# Patient Record
Sex: FEMALE | Race: WHITE | NOT HISPANIC OR LATINO | URBAN - METROPOLITAN AREA
[De-identification: names, ages, dates, MRNs, and addresses within clinical notes are randomized per-mention and may not be internally consistent; named-entity substitution may affect disease eponyms.]

---

## 2019-01-04 ENCOUNTER — OFFICE VISIT CONVERTED (OUTPATIENT)
Dept: OTHER | Facility: HOSPITAL | Age: 29
End: 2019-01-04
Attending: NURSE PRACTITIONER

## 2019-01-04 ENCOUNTER — CONVERSION ENCOUNTER (OUTPATIENT)
Dept: OTHER | Facility: HOSPITAL | Age: 29
End: 2019-01-04

## 2019-02-04 ENCOUNTER — OFFICE VISIT CONVERTED (OUTPATIENT)
Dept: OTHER | Facility: HOSPITAL | Age: 29
End: 2019-02-04
Attending: NURSE PRACTITIONER

## 2019-02-04 ENCOUNTER — CONVERSION ENCOUNTER (OUTPATIENT)
Dept: OTHER | Facility: HOSPITAL | Age: 29
End: 2019-02-04

## 2021-05-15 VITALS
OXYGEN SATURATION: 97 % | HEART RATE: 89 BPM | TEMPERATURE: 97.7 F | DIASTOLIC BLOOD PRESSURE: 60 MMHG | RESPIRATION RATE: 18 BRPM | HEIGHT: 60 IN | WEIGHT: 181 LBS | SYSTOLIC BLOOD PRESSURE: 118 MMHG | BODY MASS INDEX: 35.53 KG/M2

## 2021-05-15 VITALS
BODY MASS INDEX: 33.99 KG/M2 | HEIGHT: 61 IN | SYSTOLIC BLOOD PRESSURE: 118 MMHG | DIASTOLIC BLOOD PRESSURE: 60 MMHG | RESPIRATION RATE: 18 BRPM | OXYGEN SATURATION: 100 % | HEART RATE: 73 BPM | WEIGHT: 180 LBS | TEMPERATURE: 98 F

## 2025-01-14 ENCOUNTER — HOSPITAL ENCOUNTER (OUTPATIENT)
Dept: OTHER | Facility: HOSPITAL | Age: 35
Discharge: HOME | End: 2025-01-14

## 2025-05-25 ENCOUNTER — HOSPITAL ENCOUNTER (OUTPATIENT)
Dept: OTHER | Facility: HOSPITAL | Age: 35
Discharge: HOME | End: 2025-05-25

## 2025-06-26 NOTE — PROGRESS NOTES
Chief Complaint  Papilledema, IIH    Patient or patient representative verbalized consent for the use of Ambient Listening during the visit with  Satnam Boone MD PhD for chart documentation. 7/1/2025  13:46 EDT    Subjective      History of Present Illness:  Stephanie Benjamin is a delightful 34 y.o. right handed female who presents to [facility] NEUROLOGY [facility]GERY referred for papilledema with IIH with history of: Anxiety and depression, Brain tumor (benign), Chronic intractable headache, Genital herpes, seizures (last 2020).  Past Medical History:   Diagnosis Date    Brain tumor (benign)     Cataracts, bilateral     Migraine      Mom lorena accompanied today.    From [facility] 5/25/2025:  Patient presents to the ED today complaint of headache and head pressure. Patient states she does have a significant history of intracranial hypertension. Patient's symptoms started 5 days ago. Patient denies any injury or trauma. Patient that she got tingling in her left upper extremity. Patient has tried Motrin at home without relief. Patient also complains of injury to her left great toe as it was smashed and the nail was torn off. Patient denies any chest pain, shortness of breath, abdominal pain, nausea, vomiting or fever.     History of Present Illness  The patient is a 34-year-old female referred to neurology for suspicion of idiopathic intracranial hypertension. She is accompanied by her mother.    She is right-handed and has been experiencing headaches, which she believes are due to a pseudotumor. Approximately one year ago, she underwent an MRI and spinal fluid test under the care of Dr. LEE Galvez, which revealed elevated opening pressure. Despite trying various medications, including Topamax, her condition remains unchanged. She has been following up with ophthalmology since then, with her last visit in 05/2025. During this visit, her eyes were dilated, fundus photographs were  taken, visual field testing was performed, and OCT testing was conducted. She reports pressure behind her right eye and some nerve damage in the same eye.    For the past few months, she has been experiencing headaches almost daily, with each episode lasting several days. The pain intensity varies between 6 and 10 on a scale of 10. These headaches are often accompanied by dizziness, nausea, and sensitivity to light and sound. She was previously diagnosed with migraine before the pseudotumor diagnosis. She has tried various medications for her headaches, including Excedrin Migraine, Tylenol, ibuprofen, hydros, topiramate, Emgality, Ajovy, and nasal sprays. However, none of these have provided relief. She discontinued topiramate due to its ineffectiveness. She has been taking acetazolamide 250 mg twice a day for the past 2 weeks and Excedrin Migraine for the past 6 months.    She reports no history of stroke, mini stroke, heart attack, coronary artery disease, peripheral artery disease, ulcerative colitis, Crohn's disease, or uncontrolled blood pressure. She averages about 5 hours of sleep per night, which has been the case for the past 2 years. This lack of sleep results in daytime fatigue and the need for napping. She also reports snoring but does not stop breathing during these episodes. She reports no family history of headache or migraine. Her headaches improve immediately after a large volume of fluid is drawn off during the LP.    SOCIAL HISTORY:    Sleep: Averages about 5 hours of sleep per night for the past 2 years    FAMILY HISTORY  - Negative for headache and migraine    MEDICATIONS  CURRENT MEDS:  Acetazolamide 250 mg Oral Twice daily  Start Date: 06/2025  Excedrin Migraine Oral Twice weekly  Start Date: 01/2025  Metformin  PREVIOUS MEDS:  Emgality Injection  Reason for Discontinuation: Not effective  Topiramate Oral  Reason for Discontinuation: Not effective  Ajovy Injection  Reason for Discontinuation:  "Not effective  Excedrin Migraine Oral  Reason for Discontinuation: Medication overuse  Tylenol Oral  Ibuprofen Oral  Hydrocodone Oral            All available pertinent Labs and Imaging personally reviewed, including:    Imaging:    MRI orbits wow contrast 1/14/2025:  IMPRESSION:   Subtle flattening of sclera of globes, which suggests IIH. Clinically correlate.        Labs:  Lab Results   Component Value Date    WBC 6.62 06/26/2023    HGB 11.5 (L) 06/26/2023    HCT 35.8 06/26/2023    MCV 81.0 06/26/2023     06/26/2023     Lab Results   Component Value Date    BUN 10 01/13/2024    CREATININE 0.6 (L) 01/13/2024     01/13/2024    K 3.9 01/13/2024     01/13/2024    CALCIUM 9.1 01/13/2024    ALBUMIN 3.9 01/13/2024    ALT 18 01/13/2024    AST 15 01/13/2024    ALKPHOS 104 01/13/2024    BILITOT 0.33 01/13/2024    ANIONGAP 9 01/13/2024     No results found for: \"HGBA1C\"  No results found for: \"TSH\"  Lab Results   Component Value Date    WMUFGVCN86 145 01/14/2025     No results found for: \"FOLATE\"  No results found for: \"CHOL\", \"CHLPL\", \"TRIG\", \"HDL\", \"LDL\", \"LDLDIRECT\"      Objective   Vital Signs:   /71   Pulse 76   Ht 152.4 cm (60\")   Wt 103 kg (227 lb 8 oz)   SpO2 100%   BMI 44.43 kg/m²     GENERAL:  GEN: owgt, well appearing, no apparent distress, appropriately dressed and groomed.  HEENT: NCAT, nml symm facies, no LNA, no goiter  LUNGS: CTA alicia, no wheezes/crackles/rhonchi noted  Card: RRR, no m noted, no carotid bruit, alicia rad and dp pulses 2+ with cap refill < 2 sec  EXT: no cce, no rash noted    NEUROLOGICAL:  MS: A+O x 3, language spontaneous, fluent with logical content, no word finding, no repeating or perseveration, reported own and regarded as excellent historian, normal judgement and insight, anxious;   Mom present and adds to and corroborates history.  CN: PERRLA, full excursions in all cardinal directions with smooth pursuits throughout without saccadic breaks or nystagmus noted; " horizontal and vertical saccades symmetric and on target. Cover test reveals no phoria. Visual fields full to confrontation. V1-III Light touch symm and intact; symm jaw clench masseter and temporalis bulk and tone, medial and lateral pterygoids intact. Symm forehead crease and grimace. Hearing grossly symm and intact to finger rub. Uvula and soft palate midline rise on gag. Sternocleidomastoids and traps symm and 5/5. Tongue demonstrates no furrowing and extends midline and into left and right.  MOTOR: no atrophy/drift, normal tone, strength 5/5, no adventitial movements or tremor noted  SENSORY: LT/PP/Vib intact, symm, and wnL for age. Romberg - NEG  COORD: JUAN/FTN/HTS with good rhythm, speed, and amplitude. No ataxia noted.  GAIT: Native mild wide based valgus knees gait with good stride, nml symm alicia armswing, nml start/stop/turn. Toe and heel walk without difficulty. Tandem walk without difficulty.  DTR's: 2+ throughout with 3+ patellas; no clonus, Babinski - Absent     Trigger points in neck and upper back which upon palpation reproduce/exacerbate headache pattern (100%).      ASSESSMENT & PLAN:    34 y.o. female who presents to [facility] NEUROLOGY [facility] NE[facility]OSURGERY referred for papilledema with IIH.    From [facility] 5/25/2025:  Patient presents to the ED today complaint of headache and head pressure. Patient states she does have a significant history of intracranial hypertension. Patient's symptoms started 5 days ago. Patient denies any injury or trauma. Patient that she got tingling in her left upper extremity. Patient has tried Motrin at home without relief. Patient also complains of injury to her left great toe as it was smashed and the nail was torn off. Patient denies any chest pain, shortness of breath, abdominal pain, nausea, vomiting or fever.          Diagnoses and all orders for this visit:    1. Pseudotumor (Primary)    2. Medication overuse headache    3. Bilateral occipital  neuralgia         Assessment & Plan  1. Mixed headache disorder.  She presents with a mixed headache disorder, including components of migraines, medication overuse, bilateral occipital neuralgia, and pseudotumor. The primary concern is the pseudotumor, which can lead to vision loss. Her current medication regimen includes acetazolamide 250 mg twice daily and Excedrin Migraine. The potential side effects of acetazolamide were discussed, including dizziness, gastrointestinal upset, tingling in the hands and around the mouth, changes in taste, brain fog, and possible weight loss of 10 to 15 pounds over 4 to 6 months. It was explained that weight loss could be beneficial for her pseudotumor. The risks associated with medication overuse were also discussed. She was advised to continue her regular ophthalmology visits every 3 months. Her acetazolamide dosage will be increased to 500 mg twice daily by titration with 500mg every morning for 1 week then twice daily.  She was advised to avoid Tylenol, caffeine, and aspirin for a minimum of 3 months. If she tolerates the increased acetazolamide dosage but continues to experience headaches, the dosage may be further increased or zonisamide may be added to her treatment plan.  At FU, will consider increasing diamox/acetazolamide to 500mg tid.  Keep a headache diary.  Patient will discuss obtaining prior records (LP, Neuro, Ophtho) with  on discharge.    Follow-up  The patient will follow up in 1 month.    Common side effects of acetazolamide explained to patient may include: change in taste, weight loss, marly-oral pins and needles, mental confusion. Advised to remain hydrated to help avoid increased risk of kidney stones.    EXTERNAL RECORD REVIEW:  Neurology:  2/14/2025 ([facility]): seen for migraine, MRI brain with soft signs of pseudotumor 1/14/2025 noted with plan to obtain LP.  Lumbar Puncture:  6/26/2023: IR guided LP, OP 25 cm H2O, 10 cc removed, CP 19 cm.  Ophtho  notes:  5/16/2025: No papilledema noted.  11/25/2024: No papilledema  4/10/2024: No papilledema      Follow Up  Return in about 4 weeks (around 7/29/2025) for IIH, acetazolamide titration.    47 minutes were spent caring for Stephanie Benjamin on this date of service. This time spent by me includes preparing for the visit, reviewing tests, obtaining/reviewing separately obtained history, performing medically appropriate exam/evaluation, counseling/educating the patient/family/caregiver, ordering medications/tests/procedures, referring/communicating with other health care professionals, documenting information in the medical record, independently interpreting results and communicating that with the patient/family/caregiver and/or care coordination.     Patient was given instructions and counseling regarding her condition or for health maintenance advice. Please see specific information pulled into the AVS if appropriate.

## 2025-06-27 ENCOUNTER — HOSPITAL ENCOUNTER (EMERGENCY)
Facility: HOSPITAL | Age: 35
Discharge: HOME | End: 2025-06-27
Attending: EMERGENCY MEDICINE
Payer: MEDICAID

## 2025-06-27 ENCOUNTER — APPOINTMENT (OUTPATIENT)
Dept: CT IMAGING | Facility: HOSPITAL | Age: 35
End: 2025-06-27
Payer: MEDICAID

## 2025-06-27 VITALS
HEART RATE: 98 BPM | BODY MASS INDEX: 45.66 KG/M2 | OXYGEN SATURATION: 96 % | SYSTOLIC BLOOD PRESSURE: 107 MMHG | HEIGHT: 60 IN | TEMPERATURE: 98.1 F | RESPIRATION RATE: 18 BRPM | DIASTOLIC BLOOD PRESSURE: 76 MMHG | WEIGHT: 232.59 LBS

## 2025-06-27 DIAGNOSIS — G43.809 OTHER MIGRAINE WITHOUT STATUS MIGRAINOSUS, NOT INTRACTABLE: Primary | ICD-10-CM

## 2025-06-27 PROCEDURE — 25810000003 LACTATED RINGERS SOLUTION

## 2025-06-27 PROCEDURE — 25010000002 KETOROLAC TROMETHAMINE PER 15 MG

## 2025-06-27 PROCEDURE — 25010000002 METOCLOPRAMIDE PER 10 MG

## 2025-06-27 PROCEDURE — 70450 CT HEAD/BRAIN W/O DYE: CPT

## 2025-06-27 PROCEDURE — 96374 THER/PROPH/DIAG INJ IV PUSH: CPT

## 2025-06-27 PROCEDURE — 25010000002 DIPHENHYDRAMINE PER 50 MG

## 2025-06-27 PROCEDURE — 99284 EMERGENCY DEPT VISIT MOD MDM: CPT

## 2025-06-27 PROCEDURE — 96375 TX/PRO/DX INJ NEW DRUG ADDON: CPT

## 2025-06-27 RX ORDER — METOCLOPRAMIDE HYDROCHLORIDE 5 MG/ML
10 INJECTION INTRAMUSCULAR; INTRAVENOUS ONCE
Status: COMPLETED | OUTPATIENT
Start: 2025-06-27 | End: 2025-06-27

## 2025-06-27 RX ORDER — VENLAFAXINE HYDROCHLORIDE 150 MG/1
150 CAPSULE, EXTENDED RELEASE ORAL DAILY
COMMUNITY
Start: 2024-09-24 | End: 2025-09-25

## 2025-06-27 RX ORDER — ACETAMINOPHEN, ASPIRIN, AND CAFFEINE 250; 250; 65 MG/1; MG/1; MG/1
TABLET, FILM COATED ORAL AS NEEDED
COMMUNITY

## 2025-06-27 RX ORDER — DIPHENHYDRAMINE HYDROCHLORIDE 50 MG/ML
25 INJECTION, SOLUTION INTRAMUSCULAR; INTRAVENOUS ONCE
Status: COMPLETED | OUTPATIENT
Start: 2025-06-27 | End: 2025-06-27

## 2025-06-27 RX ORDER — ACETAZOLAMIDE 250 MG/1
250 TABLET ORAL
COMMUNITY
Start: 2025-05-30

## 2025-06-27 RX ORDER — KETOROLAC TROMETHAMINE 30 MG/ML
30 INJECTION, SOLUTION INTRAMUSCULAR; INTRAVENOUS ONCE
Status: COMPLETED | OUTPATIENT
Start: 2025-06-27 | End: 2025-06-27

## 2025-06-27 RX ADMIN — SODIUM CHLORIDE, POTASSIUM CHLORIDE, SODIUM LACTATE AND CALCIUM CHLORIDE 500 ML: 600; 310; 30; 20 INJECTION, SOLUTION INTRAVENOUS at 20:26

## 2025-06-27 RX ADMIN — KETOROLAC TROMETHAMINE 30 MG: 30 INJECTION, SOLUTION INTRAMUSCULAR; INTRAVENOUS at 20:27

## 2025-06-27 RX ADMIN — METOCLOPRAMIDE 10 MG: 5 INJECTION, SOLUTION INTRAMUSCULAR; INTRAVENOUS at 20:27

## 2025-06-27 RX ADMIN — DIPHENHYDRAMINE HYDROCHLORIDE 25 MG: 50 INJECTION, SOLUTION INTRAMUSCULAR; INTRAVENOUS at 20:26

## 2025-06-27 NOTE — ED PROVIDER NOTES
"SHARED VISIT ATTESTATION:    This visit was performed by myself and an APC.  I personally approved the management plan/medical decision making and take responsibility for the patient management.      SHARED VISIT NOTE:    Patient is 34 y.o. year old female that presents to the ED for evaluation of migraine headache.     Physical Exam    ED Course:    /76 (BP Location: Left arm, Patient Position: Lying)   Pulse 98   Temp 98.1 °F (36.7 °C) (Oral)   Resp 18   Ht 152.4 cm (60\")   Wt 105 kg (232 lb 9.4 oz)   SpO2 96%   BMI 45.42 kg/m²       The following orders were placed and all results were independently analyzed by me:  Orders Placed This Encounter   Procedures    CT Head Without Contrast       Medications Given in the Emergency Department:  Medications   lactated ringers bolus 500 mL (0 mL Intravenous Stopped 6/27/25 2134)   ketorolac (TORADOL) injection 30 mg (30 mg Intravenous Given 6/27/25 2027)   metoclopramide (REGLAN) injection 10 mg (10 mg Intravenous Given 6/27/25 2027)   diphenhydrAMINE (BENADRYL) injection 25 mg (25 mg Intravenous Given 6/27/25 2026)        ED Course:    ED Course as of 06/27/25 2141 Fri Jun 27, 2025 2128 Patient states her migraine has improved [AJ]      ED Course User Index  [AJ] Brandon Joiner PA-C       Labs:    Lab Results (last 24 hours)       ** No results found for the last 24 hours. **             Imaging:    CT Head Without Contrast  Result Date: 6/27/2025  CT HEAD WO CONTRAST Date of Exam: 6/27/2025 8:04 PM EDT Indication: HA/VC. Comparison: 1/24/2023 Technique: Axial CT images were obtained of the head without contrast administration.  Reconstructed coronal and sagittal images were also obtained. Automated exposure control and iterative construction methods were used. Findings: Gray-white matter differentiation is maintained without evidence of an acute infarction. No intracranial mass or mass effect. No extra-axial mass or collection. The ventricles and " sulci are normal in size and configuration. The posterior fossa appears normal. Sellar and suprasellar structures are normal. Orbital and paranasal soft tissues are normal. Mucoid retention cyst or polyp within the right maxillary sinus. The bony calvarium appears intact. No acute fractures. No lytic or blastic bony diseases.     Impression: No acute intracranial pathology. Electronically Signed: Papa Bridges MD  6/27/2025 8:32 PM EDT  Workstation ID: [id number]      MDM:    Procedures    CT scan was performed in the emergency department and the CT scan radiology impression was interpreted by me.                     Tobin Monk MD  21:41 EDT  06/27/25         Tobin Monk MD  06/27/25 [id number]

## 2025-06-27 NOTE — ED PROVIDER NOTES
Time: 7:56 PM EDT  Date of encounter:  6/27/2025  Independent Historian/Clinical History and Information was obtained by:   Patient    History is limited by: N/A    Chief Complaint   Patient presents with    Headache         History of Present Illness:  Patient is a 34 y.o. year old female who presents to the emergency department for evaluation of migraine that started this morning upon awakening.  Patient has a history of migraines and states she also has a history of intracranial hypertension.  States that she started feeling off yesterday and started having some blurry vision in her left eye.  She states she thought it was normal because she has had cataract surgery in her left eye before.  She states it is seem more blurry than normal.  She denies fall or injury.  She took some migraine medication that she cannot remember the name of.  Admits to nausea, vomiting and photosensitivity.    Patient Care Team  Primary Care Provider: Anthony Falk MD    Past Medical History:     No Known Allergies  Past Medical History:   Diagnosis Date    Brain tumor (benign)     Cataracts, bilateral     Migraine      Past Surgical History:   Procedure Laterality Date    EYE SURGERY Bilateral     HYSTERECTOMY       History reviewed. No pertinent family history.    Home Medications:  Prior to Admission medications    Medication Sig Start Date End Date Taking? Authorizing Provider   acetaZOLAMIDE (DIAMOX) 250 MG tablet Take 1 tablet by mouth. 5/30/25  Yes Aidee Ruano MD   metFORMIN (GLUCOPHAGE) 500 MG tablet Take 1 tablet by mouth. 5/30/25 5/31/26 Yes Aidee Ruano MD   venlafaxine XR (EFFEXOR-XR) 150 MG 24 hr capsule Take 1 capsule by mouth Daily. 9/24/24 9/25/25 Yes Aidee Ruano MD   aspirin-acetaminophen-caffeine (Excedrin Migraine) 250-250-65 MG per tablet As Needed.    ProviderAidee MD        Social History:   Social History     Tobacco Use    Smoking status: Never    Smokeless tobacco: Never  "  Vaping Use    Vaping status: Never Used   Substance Use Topics    Alcohol use: Never    Drug use: Never         Review of Systems:  Review of Systems   Eyes:  Positive for photophobia and visual disturbance.   Gastrointestinal:  Positive for nausea and vomiting.   Neurological:  Positive for headaches. Negative for speech difficulty and weakness.        Physical Exam:  /90   Pulse 79   Temp 98.8 °F (37.1 °C) (Oral)   Resp 22   Ht 152.4 cm (60\")   Wt 105 kg (232 lb 9.4 oz)   SpO2 96%   BMI 45.42 kg/m²         Physical Exam  Vitals and nursing note reviewed.   Constitutional:       Appearance: Normal appearance.   HENT:      Head: Normocephalic and atraumatic.      Nose: Nose normal.      Mouth/Throat:      Mouth: Mucous membranes are moist.   Eyes:      General:         Right eye: No discharge.         Left eye: No discharge.      Extraocular Movements: Extraocular movements intact.      Conjunctiva/sclera: Conjunctivae normal.      Pupils: Pupils are equal, round, and reactive to light.   Cardiovascular:      Rate and Rhythm: Normal rate and regular rhythm.      Heart sounds: Normal heart sounds.   Pulmonary:      Effort: Pulmonary effort is normal.      Breath sounds: Normal breath sounds.   Abdominal:      General: Abdomen is flat.      Palpations: Abdomen is soft.   Musculoskeletal:         General: Normal range of motion.      Cervical back: Normal range of motion and neck supple.   Skin:     General: Skin is warm and dry.   Neurological:      General: No focal deficit present.      Mental Status: She is alert and oriented to person, place, and time.      Cranial Nerves: No cranial nerve deficit.      Motor: No weakness.      Coordination: Coordination normal.   Psychiatric:         Mood and Affect: Mood normal.         Behavior: Behavior normal.                          Medical Decision Making:      Comorbidities that affect care:    Brain tumor, migraine, cataract    External Notes " reviewed:    Previous Clinic Note: PCP visit 5/30/2025 for Papilledema, idiopathic intracranial hypertension referred to neurology., Previous ED Note: ED visit at [facility] 5/25/2025 for headache, and Previous Radiological Studies: CT head 5/25/2025 that showed sinusitis      The following orders were placed and all results were independently analyzed by me:  Orders Placed This Encounter   Procedures    CT Head Without Contrast       Medications Given in the Emergency Department:  Medications   lactated ringers bolus 500 mL (500 mL Intravenous New Bag 6/27/25 2026)   ketorolac (TORADOL) injection 30 mg (30 mg Intravenous Given 6/27/25 2027)   metoclopramide (REGLAN) injection 10 mg (10 mg Intravenous Given 6/27/25 2027)   diphenhydrAMINE (BENADRYL) injection 25 mg (25 mg Intravenous Given 6/27/25 2026)        ED Course:    The patient was initially evaluated in the triage area where orders were placed. The patient was later dispositioned by Brandon Joiner PA-C.      The patient was advised to stay for completion of workup which includes but is not limited to communication of labs and radiological results, reassessment and plan. The patient was advised that leaving prior to disposition by a provider could result in critical findings that are not communicated to the patient.     ED Course as of 06/27/25 2129 Fri Jun 27, 2025 2128 Patient states her migraine has improved [AJ]      ED Course User Index  [AJ] Brandon Joiner PA-C       Labs:    Lab Results (last 24 hours)       ** No results found for the last 24 hours. **             Imaging:    CT Head Without Contrast  Result Date: 6/27/2025  CT HEAD WO CONTRAST Date of Exam: 6/27/2025 8:04 PM EDT Indication: HA/VC. Comparison: 1/24/2023 Technique: Axial CT images were obtained of the head without contrast administration.  Reconstructed coronal and sagittal images were also obtained. Automated exposure control and iterative construction methods  were used. Findings: Gray-white matter differentiation is maintained without evidence of an acute infarction. No intracranial mass or mass effect. No extra-axial mass or collection. The ventricles and sulci are normal in size and configuration. The posterior fossa appears normal. Sellar and suprasellar structures are normal. Orbital and paranasal soft tissues are normal. Mucoid retention cyst or polyp within the right maxillary sinus. The bony calvarium appears intact. No acute fractures. No lytic or blastic bony diseases.     Impression: No acute intracranial pathology. Electronically Signed: Papa Bridges MD  6/27/2025 8:32 PM EDT  Workstation ID: [id number]        Differential Diagnosis and Discussion:      Headache: Differential diagnosis includes but is not limited to migraine, cluster headache, hypertension, tumor, subarachnoid bleeding, pseudotumor cerebri, temporal arteritis, infections, tension headache, and TMJ syndrome.    PROCEDURES:    CT scan was performed in the emergency department and the CT scan radiology impression was interpreted by me.    No orders to display        Procedures    MDM     Amount and/or Complexity of Data Reviewed  Tests in the radiology section of CPT®: reviewed                     Patient Care Considerations:          Consultants/Shared Management Plan:    SHARED VISIT: I have discussed the case with my supervising physician, Dr. Monk who states reviewed patient's imaging. The substantive portion of the medical decision was made by the attesting physician who made or approve the management plan and will take responsibility for the patient.  Clinical findings were discussed and ultimate disposition was made in consult with supervising physician.    Social Determinants of Health:    Patient has presented with family members who are responsible, reliable and will ensure follow up care.      Disposition and Care Coordination:    Discharged: The patient is suitable and stable for  discharge with no need for consideration of admission.    I have explained the patient´s condition, diagnoses and treatment plan based on the information available to me at this time. I have answered questions and addressed any concerns. The patient has a good  understanding of the patient´s diagnosis, condition, and treatment plan as can be expected at this point. The vital signs have been stable. The patient´s condition is stable and appropriate for discharge from the emergency department.      The patient will pursue further outpatient evaluation with the primary care physician or other designated or consulting physician as outlined in the discharge instructions. They are agreeable to this plan of care and follow-up instructions have been explained in detail. The patient has received these instructions in written format and has expressed an understanding of the discharge instructions. The patient is aware that any significant change in condition or worsening of symptoms should prompt an immediate return to this or the closest emergency department or call to 911.    Final diagnoses:   Other migraine without status migrainosus, not intractable        ED Disposition       ED Disposition   Discharge    Condition   Stable    Comment   --               This medical record created using voice recognition software.             Brandon Joiner PA-C  06/27/25 [id number]

## 2025-07-01 ENCOUNTER — OFFICE VISIT (OUTPATIENT)
Dept: NEUROLOGY | Facility: CLINIC | Age: 35
End: 2025-07-01
Payer: MEDICAID

## 2025-07-01 VITALS
OXYGEN SATURATION: 100 % | DIASTOLIC BLOOD PRESSURE: 71 MMHG | WEIGHT: 227.5 LBS | HEART RATE: 76 BPM | SYSTOLIC BLOOD PRESSURE: 103 MMHG | BODY MASS INDEX: 44.66 KG/M2 | HEIGHT: 60 IN

## 2025-07-01 DIAGNOSIS — G93.2 PSEUDOTUMOR: Primary | ICD-10-CM

## 2025-07-01 DIAGNOSIS — M54.81 BILATERAL OCCIPITAL NEURALGIA: ICD-10-CM

## 2025-07-01 DIAGNOSIS — G44.40 MEDICATION OVERUSE HEADACHE: ICD-10-CM

## 2025-07-01 RX ORDER — ACETAZOLAMIDE 500 MG/1
CAPSULE, EXTENDED RELEASE ORAL
Qty: 180 CAPSULE | Refills: 1 | Status: SHIPPED | OUTPATIENT
Start: 2025-07-01

## 2025-07-01 RX ORDER — CETIRIZINE HYDROCHLORIDE 10 MG/1
10 TABLET ORAL DAILY
COMMUNITY
Start: 2025-01-14 | End: 2026-01-15

## 2025-07-01 RX ORDER — DEXAMETHASONE 6 MG/1
TABLET ORAL
COMMUNITY
Start: 2025-03-24

## 2025-07-01 RX ORDER — FLUTICASONE PROPIONATE 50 MCG
2 SPRAY, SUSPENSION (ML) NASAL DAILY
COMMUNITY
Start: 2025-01-14 | End: 2026-01-15

## 2025-07-01 RX ORDER — BACLOFEN 20 MG/1
TABLET ORAL
COMMUNITY
Start: 2025-03-24

## 2025-07-01 RX ORDER — GALCANEZUMAB 120 MG/ML
120 INJECTION, SOLUTION SUBCUTANEOUS
COMMUNITY
Start: 2025-03-24 | End: 2026-03-25

## 2025-07-01 RX ORDER — FERROUS SULFATE 325(65) MG
325 TABLET ORAL
COMMUNITY
Start: 2024-08-02 | End: 2025-08-03

## 2025-07-01 RX ORDER — AZELASTINE 1 MG/ML
1 SPRAY, METERED NASAL
COMMUNITY
Start: 2025-01-23 | End: 2026-01-24